# Patient Record
Sex: FEMALE | URBAN - NONMETROPOLITAN AREA
[De-identification: names, ages, dates, MRNs, and addresses within clinical notes are randomized per-mention and may not be internally consistent; named-entity substitution may affect disease eponyms.]

---

## 2022-11-02 ENCOUNTER — WEB ENCOUNTER (OUTPATIENT)
Dept: URBAN - NONMETROPOLITAN AREA CLINIC 4 | Facility: CLINIC | Age: 27
End: 2022-11-02

## 2022-11-08 ENCOUNTER — OFFICE VISIT (OUTPATIENT)
Dept: URBAN - NONMETROPOLITAN AREA CLINIC 4 | Facility: CLINIC | Age: 27
End: 2022-11-08
Payer: OTHER GOVERNMENT

## 2022-11-08 ENCOUNTER — DASHBOARD ENCOUNTERS (OUTPATIENT)
Age: 27
End: 2022-11-08

## 2022-11-08 VITALS
HEART RATE: 80 BPM | DIASTOLIC BLOOD PRESSURE: 88 MMHG | SYSTOLIC BLOOD PRESSURE: 134 MMHG | HEIGHT: 63 IN | WEIGHT: 293 LBS | TEMPERATURE: 97.5 F | BODY MASS INDEX: 51.91 KG/M2

## 2022-11-08 DIAGNOSIS — K21.9 GASTROESOPHAGEAL REFLUX DISEASE, UNSPECIFIED WHETHER ESOPHAGITIS PRESENT: ICD-10-CM

## 2022-11-08 DIAGNOSIS — R14.0 BLOATING: ICD-10-CM

## 2022-11-08 DIAGNOSIS — Z88.9 MULTIPLE ALLERGIES: ICD-10-CM

## 2022-11-08 DIAGNOSIS — R11.0 NAUSEA: ICD-10-CM

## 2022-11-08 DIAGNOSIS — R19.8 ALTERNATING CONSTIPATION AND DIARRHEA: ICD-10-CM

## 2022-11-08 DIAGNOSIS — R60.0 FACIAL EDEMA: ICD-10-CM

## 2022-11-08 DIAGNOSIS — R10.13 EPIGASTRIC PAIN: ICD-10-CM

## 2022-11-08 DIAGNOSIS — E66.01 MORBID OBESITY: ICD-10-CM

## 2022-11-08 PROBLEM — 238136002: Status: ACTIVE | Noted: 2022-11-08

## 2022-11-08 PROBLEM — 235595009: Status: ACTIVE | Noted: 2022-11-08

## 2022-11-08 PROBLEM — 609328004: Status: ACTIVE | Noted: 2022-11-08

## 2022-11-08 PROCEDURE — 99204 OFFICE O/P NEW MOD 45 MIN: CPT | Performed by: REGISTERED NURSE

## 2022-11-08 RX ORDER — OMEPRAZOLE 20 MG/1
1 CAPSULE CAPSULE, DELAYED RELEASE ORAL TWICE A DAY
Status: ACTIVE | COMMUNITY

## 2022-11-08 RX ORDER — OMEPRAZOLE 20 MG/1
1 CAPSULE CAPSULE, DELAYED RELEASE ORAL TWICE A DAY
OUTPATIENT

## 2022-11-08 RX ORDER — PANTOPRAZOLE SODIUM 40 MG/1
1 TABLET TABLET, DELAYED RELEASE ORAL ONCE A DAY
Qty: 30 TABLET | Refills: 2 | OUTPATIENT

## 2022-11-08 NOTE — HPI-TODAY'S VISIT:
11/8/22: Pt is a 28 yo female with PMH of anxiety, depression,PCOS, allergies and asthma who is self referred by for evaluation of chronic GI issues.   Pt reports chronic GI issues with worsening symptoms as she has gotten older. She reports alternating diarrhea and constipation. Mostly has diarrhea. Has associated bloating, abdominal cramping and gas. Denies hematochezia. Has never had colonoscopy in past. Maternal grandfather had colon cancer.   Pt reports chronic reflux. Has been taking omeprazole 20 mg BID for past 1.5 yrs. Seen by [facility] 1-2 years ago for nausea, epigastric pain, gas, chest pain. She was supposed to get EGD, but did not. She continues to have these symptoms. Denies dysphagia, odynophagia, vomiting, melena.   Seen by allergist in past. Found to have allergies to red meat and soy. Has intermittent swelling of lips, eyelids, nose, throat, fingertips, ankles, arms since 2019.

## 2022-11-08 NOTE — PHYSICAL EXAM CONSTITUTIONAL:
well developed, obese, well nourished , in no acute distress , ambulating without difficulty , normal communication ability

## 2022-11-09 LAB
A/G RATIO: 1.1
ALBUMIN: 3.8
ALKALINE PHOSPHATASE: 83
ALT (SGPT): 20
AST (SGOT): 13
BILIRUBIN, TOTAL: 0.4
BUN/CREATININE RATIO: (no result)
BUN: 10
CALCIUM: 9.4
CARBON DIOXIDE, TOTAL: 29
CHLORIDE: 102
CREATININE: 0.64
EGFR: 123
GLOBULIN, TOTAL: 3.4
GLUCOSE: 81
HEMATOCRIT: 38.3
HEMOGLOBIN: 12.6
IMMUNOGLOBULIN A: 314
INTERPRETATION: (no result)
LIPASE: 16
MCH: 26.5
MCHC: 32.9
MCV: 80.5
MPV: 9.9
PLATELET COUNT: 291
POTASSIUM: 4.7
PROTEIN, TOTAL: 7.2
RDW: 13.3
RED BLOOD CELL COUNT: 4.76
SODIUM: 138
TISSUE TRANSGLUTAMINASE AB, IGA: <1
WHITE BLOOD CELL COUNT: 8.6

## 2023-01-09 ENCOUNTER — OFFICE VISIT (OUTPATIENT)
Dept: URBAN - NONMETROPOLITAN AREA CLINIC 4 | Facility: CLINIC | Age: 28
End: 2023-01-09

## 2023-02-08 ENCOUNTER — TELEPHONE ENCOUNTER (OUTPATIENT)
Dept: URBAN - NONMETROPOLITAN AREA CLINIC 4 | Facility: CLINIC | Age: 28
End: 2023-02-08

## 2023-02-08 RX ORDER — PANTOPRAZOLE SODIUM 40 MG/1
1 TABLET TABLET, DELAYED RELEASE ORAL ONCE A DAY
Qty: 30 TABLET | Refills: 2